# Patient Record
Sex: FEMALE | Race: BLACK OR AFRICAN AMERICAN | NOT HISPANIC OR LATINO | URBAN - METROPOLITAN AREA
[De-identification: names, ages, dates, MRNs, and addresses within clinical notes are randomized per-mention and may not be internally consistent; named-entity substitution may affect disease eponyms.]

---

## 2020-11-10 ENCOUNTER — HOSPITAL ENCOUNTER (EMERGENCY)
Facility: HOSPITAL | Age: 25
Discharge: HOME | End: 2020-11-10
Attending: EMERGENCY MEDICINE
Payer: MEDICAID

## 2020-11-10 VITALS
WEIGHT: 129 LBS | HEART RATE: 72 BPM | HEIGHT: 65 IN | BODY MASS INDEX: 21.49 KG/M2 | SYSTOLIC BLOOD PRESSURE: 109 MMHG | OXYGEN SATURATION: 100 % | RESPIRATION RATE: 16 BRPM | DIASTOLIC BLOOD PRESSURE: 55 MMHG | TEMPERATURE: 98 F

## 2020-11-10 DIAGNOSIS — B96.89 BACTERIAL VAGINOSIS: ICD-10-CM

## 2020-11-10 DIAGNOSIS — R10.9 ABDOMINAL PAIN, UNSPECIFIED ABDOMINAL LOCATION: ICD-10-CM

## 2020-11-10 DIAGNOSIS — N12 PYELONEPHRITIS: ICD-10-CM

## 2020-11-10 DIAGNOSIS — N76.0 BACTERIAL VAGINOSIS: ICD-10-CM

## 2020-11-10 DIAGNOSIS — R30.0 DYSURIA: Primary | ICD-10-CM

## 2020-11-10 LAB
ALBUMIN SERPL BCP-MCNC: 4 G/DL (ref 3.5–5.2)
ALP SERPL-CCNC: 44 U/L (ref 55–135)
ALT SERPL W/O P-5'-P-CCNC: 20 U/L (ref 10–44)
ANION GAP SERPL CALC-SCNC: 10 MMOL/L (ref 8–16)
AST SERPL-CCNC: 19 U/L (ref 10–40)
B-HCG UR QL: NEGATIVE
BACTERIA #/AREA URNS AUTO: ABNORMAL /HPF
BACTERIA GENITAL QL WET PREP: ABNORMAL
BASOPHILS # BLD AUTO: 0.03 K/UL (ref 0–0.2)
BASOPHILS NFR BLD: 0.3 % (ref 0–1.9)
BILIRUB SERPL-MCNC: 0.3 MG/DL (ref 0.1–1)
BILIRUB UR QL STRIP: NEGATIVE
BUN SERPL-MCNC: 14 MG/DL (ref 6–20)
CALCIUM SERPL-MCNC: 9.6 MG/DL (ref 8.7–10.5)
CHLORIDE SERPL-SCNC: 105 MMOL/L (ref 95–110)
CLARITY UR REFRACT.AUTO: ABNORMAL
CLUE CELLS VAG QL WET PREP: ABNORMAL
CO2 SERPL-SCNC: 21 MMOL/L (ref 23–29)
COLOR UR AUTO: YELLOW
CREAT SERPL-MCNC: 0.7 MG/DL (ref 0.5–1.4)
CTP QC/QA: YES
DIFFERENTIAL METHOD: ABNORMAL
EOSINOPHIL # BLD AUTO: 0.1 K/UL (ref 0–0.5)
EOSINOPHIL NFR BLD: 1.2 % (ref 0–8)
ERYTHROCYTE [DISTWIDTH] IN BLOOD BY AUTOMATED COUNT: 16.5 % (ref 11.5–14.5)
EST. GFR  (AFRICAN AMERICAN): >60 ML/MIN/1.73 M^2
EST. GFR  (NON AFRICAN AMERICAN): >60 ML/MIN/1.73 M^2
FILAMENT FUNGI VAG WET PREP-#/AREA: ABNORMAL
GLUCOSE SERPL-MCNC: 107 MG/DL (ref 70–110)
GLUCOSE UR QL STRIP: NEGATIVE
HCT VFR BLD AUTO: 38 % (ref 37–48.5)
HGB BLD-MCNC: 11.6 G/DL (ref 12–16)
HGB UR QL STRIP: ABNORMAL
HYALINE CASTS UR QL AUTO: 0 /LPF
IMM GRANULOCYTES # BLD AUTO: 0.02 K/UL (ref 0–0.04)
IMM GRANULOCYTES NFR BLD AUTO: 0.2 % (ref 0–0.5)
KETONES UR QL STRIP: NEGATIVE
LEUKOCYTE ESTERASE UR QL STRIP: ABNORMAL
LIPASE SERPL-CCNC: 22 U/L (ref 4–60)
LYMPHOCYTES # BLD AUTO: 1.9 K/UL (ref 1–4.8)
LYMPHOCYTES NFR BLD: 20.2 % (ref 18–48)
MCH RBC QN AUTO: 24.3 PG (ref 27–31)
MCHC RBC AUTO-ENTMCNC: 30.5 G/DL (ref 32–36)
MCV RBC AUTO: 80 FL (ref 82–98)
MICROSCOPIC COMMENT: ABNORMAL
MONOCYTES # BLD AUTO: 1.1 K/UL (ref 0.3–1)
MONOCYTES NFR BLD: 11 % (ref 4–15)
NEUTROPHILS # BLD AUTO: 6.4 K/UL (ref 1.8–7.7)
NEUTROPHILS NFR BLD: 67.1 % (ref 38–73)
NITRITE UR QL STRIP: NEGATIVE
NRBC BLD-RTO: 0 /100 WBC
PH UR STRIP: 6 [PH] (ref 5–8)
PLATELET # BLD AUTO: 344 K/UL (ref 150–350)
PMV BLD AUTO: 12.2 FL (ref 9.2–12.9)
POTASSIUM SERPL-SCNC: 4.4 MMOL/L (ref 3.5–5.1)
PROT SERPL-MCNC: 7.9 G/DL (ref 6–8.4)
PROT UR QL STRIP: ABNORMAL
RBC # BLD AUTO: 4.78 M/UL (ref 4–5.4)
RBC #/AREA URNS AUTO: 7 /HPF (ref 0–4)
SODIUM SERPL-SCNC: 136 MMOL/L (ref 136–145)
SP GR UR STRIP: 1.01 (ref 1–1.03)
SPECIMEN SOURCE: ABNORMAL
SQUAMOUS #/AREA URNS AUTO: 1 /HPF
T VAGINALIS GENITAL QL WET PREP: ABNORMAL
URN SPEC COLLECT METH UR: ABNORMAL
WBC # BLD AUTO: 9.51 K/UL (ref 3.9–12.7)
WBC #/AREA URNS AUTO: >100 /HPF (ref 0–5)
WBC #/AREA VAG WET PREP: ABNORMAL
WBC CLUMPS UR QL AUTO: ABNORMAL
YEAST GENITAL QL WET PREP: ABNORMAL

## 2020-11-10 PROCEDURE — 99284 EMERGENCY DEPT VISIT MOD MDM: CPT | Mod: 25

## 2020-11-10 PROCEDURE — 87210 SMEAR WET MOUNT SALINE/INK: CPT

## 2020-11-10 PROCEDURE — 87088 URINE BACTERIA CULTURE: CPT

## 2020-11-10 PROCEDURE — 99284 PR EMERGENCY DEPT VISIT,LEVEL IV: ICD-10-PCS | Mod: ,,, | Performed by: EMERGENCY MEDICINE

## 2020-11-10 PROCEDURE — 87186 SC STD MICRODIL/AGAR DIL: CPT

## 2020-11-10 PROCEDURE — 81001 URINALYSIS AUTO W/SCOPE: CPT

## 2020-11-10 PROCEDURE — 99284 EMERGENCY DEPT VISIT MOD MDM: CPT | Mod: ,,, | Performed by: EMERGENCY MEDICINE

## 2020-11-10 PROCEDURE — 25000003 PHARM REV CODE 250: Performed by: EMERGENCY MEDICINE

## 2020-11-10 PROCEDURE — 87077 CULTURE AEROBIC IDENTIFY: CPT

## 2020-11-10 PROCEDURE — 83690 ASSAY OF LIPASE: CPT

## 2020-11-10 PROCEDURE — 87086 URINE CULTURE/COLONY COUNT: CPT

## 2020-11-10 PROCEDURE — 81003 URINALYSIS AUTO W/O SCOPE: CPT

## 2020-11-10 PROCEDURE — 85025 COMPLETE CBC W/AUTO DIFF WBC: CPT

## 2020-11-10 PROCEDURE — 81025 URINE PREGNANCY TEST: CPT | Performed by: EMERGENCY MEDICINE

## 2020-11-10 PROCEDURE — 80053 COMPREHEN METABOLIC PANEL: CPT

## 2020-11-10 RX ORDER — IBUPROFEN 600 MG/1
600 TABLET ORAL
Status: COMPLETED | OUTPATIENT
Start: 2020-11-10 | End: 2020-11-10

## 2020-11-10 RX ORDER — CEPHALEXIN 250 MG/1
500 CAPSULE ORAL EVERY 6 HOURS
Qty: 80 CAPSULE | Refills: 0 | Status: SHIPPED | OUTPATIENT
Start: 2020-11-10 | End: 2020-11-20

## 2020-11-10 RX ORDER — CEPHALEXIN 500 MG/1
500 CAPSULE ORAL
Status: COMPLETED | OUTPATIENT
Start: 2020-11-10 | End: 2020-11-10

## 2020-11-10 RX ORDER — PHENAZOPYRIDINE HYDROCHLORIDE 200 MG/1
200 TABLET, FILM COATED ORAL
Status: COMPLETED | OUTPATIENT
Start: 2020-11-10 | End: 2020-11-10

## 2020-11-10 RX ORDER — METRONIDAZOLE 500 MG/1
500 TABLET ORAL EVERY 12 HOURS
Qty: 20 TABLET | Refills: 0 | Status: SHIPPED | OUTPATIENT
Start: 2020-11-10 | End: 2020-11-20

## 2020-11-10 RX ADMIN — IBUPROFEN 600 MG: 600 TABLET ORAL at 04:11

## 2020-11-10 RX ADMIN — CEPHALEXIN 500 MG: 500 CAPSULE ORAL at 05:11

## 2020-11-10 RX ADMIN — PHENAZOPYRIDINE HYDROCHLORIDE 200 MG: 200 TABLET ORAL at 04:11

## 2020-11-10 NOTE — ED PROVIDER NOTES
Source of History:  Patient    Chief complaint:  Abdominal Pain (pt reports lower abdominal and back pain with blood in her urine x 2 days. + nausea. denies vaginal discharge. unknown LMP due to birth control)      HPI:  Kylie Mcdonald is a 25 y.o. female with no significant past medical history presenting to emergency department with complaint of 2 days of lower abdominal pain and now some left flank pain.  She feels nauseous but is not vomiting.  No fevers or chills.  She has had dysuria, hematuria, increased urinary frequency.  No vaginal discharge.  She recently received the birth control shot, so her last menstrual period is unknown.    She is sexually active with 1 male partner.  No history of STDs.  Declines empiric treatment for exposure to gonorrhea and chlamydia but would like a wet prep performed.    ROS: As per HPI and below:  Review of Systems   Constitutional: Negative for fever.   HENT: Negative for sore throat.    Eyes: Negative for double vision.   Respiratory: Negative for cough and shortness of breath.    Cardiovascular: Negative for chest pain.   Gastrointestinal: Positive for abdominal pain and nausea. Negative for vomiting.   Genitourinary: Positive for dysuria, flank pain, frequency, hematuria and urgency.   Musculoskeletal: Negative for falls.   Skin: Negative for rash.   Neurological: Negative for headaches.     Review of patient's allergies indicates:  No Known Allergies    No current facility-administered medications on file prior to encounter.      No current outpatient medications on file prior to encounter.       PMH:  As per HPI and below:  History reviewed. No pertinent past medical history.  History reviewed. No pertinent surgical history.    Social History     Socioeconomic History    Marital status: Single     Spouse name: Not on file    Number of children: Not on file    Years of education: Not on file    Highest education level: Not on file   Occupational History    Not on  file   Social Needs    Financial resource strain: Not on file    Food insecurity     Worry: Not on file     Inability: Not on file    Transportation needs     Medical: Not on file     Non-medical: Not on file   Tobacco Use    Smoking status: Never Smoker   Substance and Sexual Activity    Alcohol use: Yes     Comment: socially    Drug use: Never    Sexual activity: Not on file   Lifestyle    Physical activity     Days per week: Not on file     Minutes per session: Not on file    Stress: Not on file   Relationships    Social connections     Talks on phone: Not on file     Gets together: Not on file     Attends [employer] service: Not on file     Active member of club or organization: Not on file     Attends meetings of clubs or organizations: Not on file     Relationship status: Not on file   Other Topics Concern    Not on file   Social History Narrative    Not on file       History reviewed. No pertinent family history.    Physical Exam:      Vitals:    11/10/20 0320   BP: 121/75   Pulse: 95   Resp: 16   Temp: 98.8 °F (37.1 °C)     Gen: No acute distress.  Uncomfortable  Mental Status:  Alert and oriented x 3.  Appropriate, conversant.  Skin: Warm, dry. No rashes seen.  Eyes: No conjunctival injection.  Pulm: No increased work of breathing.  No significant tachypnea.  No audible stridor or wheezing.  No conversational dyspnea.  Auscultation limited secondary to PPE.  CV: Regular rate. Regular rhythm. Normal peripheral perfusion. No lower extremity edema.  Abd: Soft.  Not distended.  Mild diffuse tenderness to palpation, most prominent in the bilateral lower quadrants, without rebound tenderness or guarding  MSK: Good range of motion all joints.  No deformities.    Left CVA tenderness.  :  Normal external genitalia.  No lesions.  Thin blood-tinged discharge present in the vagina  Bilateral adnexal tenderness without masses or cervical motion tenderness.  Neuro: Awake. Speech normal. No focal neuro  deficit observed.    Laboratory Studies:  Labs Reviewed   CBC W/ AUTO DIFFERENTIAL - Abnormal; Notable for the following components:       Result Value    Hemoglobin 11.6 (*)     MCV 80 (*)     MCH 24.3 (*)     MCHC 30.5 (*)     RDW 16.5 (*)     Mono # 1.1 (*)     All other components within normal limits   COMPREHENSIVE METABOLIC PANEL - Abnormal; Notable for the following components:    CO2 21 (*)     Alkaline Phosphatase 44 (*)     All other components within normal limits   URINALYSIS, REFLEX TO URINE CULTURE - Abnormal; Notable for the following components:    Appearance, UA Cloudy (*)     Protein, UA 2+ (*)     Occult Blood UA 2+ (*)     Leukocytes, UA 3+ (*)     All other components within normal limits    Narrative:     Specimen Source->Urine   VAGINAL SCREEN - Abnormal; Notable for the following components:    Clue Cells Rare (*)     WBC - Vaginal Screen Occasional (*)     Bacteria - Vaginal Screen Few (*)     All other components within normal limits   URINALYSIS MICROSCOPIC - Abnormal; Notable for the following components:    RBC, UA 7 (*)     WBC, UA >100 (*)     WBC Clumps, UA Occasional (*)     Bacteria Many (*)     All other components within normal limits    Narrative:     Specimen Source->Urine   CULTURE, URINE   LIPASE   POCT URINE PREGNANCY     Chart reviewed.     Imaging Results    None         Medications Given:  Medications   phenazopyridine tablet 200 mg (200 mg Oral Given 11/10/20 0447)   ibuprofen tablet 600 mg (600 mg Oral Given 11/10/20 0446)   cephALEXin capsule 500 mg (500 mg Oral Given 11/10/20 0555)     MDM:    25 y.o. female with complaint of nausea, abdominal pain, flank pain, and urinary discomfort.  Here she is afebrile, stable, nontoxic.    Differential diagnosis including but not limited to:  Pyelonephritis versus urinary tract infection, intra-abdominal infection, pregnancy, ovarian cyst.  Less likely torsion.    Labs reviewed.  No leukocytosis.  Hemoglobin 11.6.  No acute  kidney injury.  CMP relatively unremarkable.    Urine pregnancy test negative.    Vaginal wet prep with rare clue cells.    Urinalysis with 2+ protein and occult blood, greater than 100 wbc's and many bacteria, consistent with urinary tract infection    Patient received Pyridium and ibuprofen for her pain with significant improvement.  Will treat for pyelonephritis with Keflex.    At time of sign-out, ultrasound of the pelvis is pending for final disposition, anticipate discharge with Keflex prescription if no acute findings.    Diagnostic Impression:    1. Dysuria    2. Abdominal pain, unspecified abdominal location    3. Pyelonephritis    4. Bacterial vaginosis               Patient and/or family understands the plan and is in agreement, verbalized understanding, questions answered    Bridgette Stock MD  Emergency Medicine       Bridgette Stock MD  11/10/20 7397

## 2020-11-10 NOTE — DISCHARGE INSTRUCTIONS
Diagnosis: 1. Urinary tract infection, kidney infection  2.  Bacterial vaginosis.  This is an overgrowth of the wrong type of bacteria in the vagina, not a sexually transmitted disease    Tests today showed:   Labs Reviewed   CBC W/ AUTO DIFFERENTIAL - Abnormal; Notable for the following components:       Result Value    Hemoglobin 11.6 (*)     MCV 80 (*)     MCH 24.3 (*)     MCHC 30.5 (*)     RDW 16.5 (*)     Mono # 1.1 (*)     All other components within normal limits   COMPREHENSIVE METABOLIC PANEL - Abnormal; Notable for the following components:    CO2 21 (*)     Alkaline Phosphatase 44 (*)     All other components within normal limits   URINALYSIS, REFLEX TO URINE CULTURE - Abnormal; Notable for the following components:    Appearance, UA Cloudy (*)     Protein, UA 2+ (*)     Occult Blood UA 2+ (*)     Leukocytes, UA 3+ (*)     All other components within normal limits    Narrative:     Specimen Source->Urine   VAGINAL SCREEN - Abnormal; Notable for the following components:    Clue Cells Rare (*)     WBC - Vaginal Screen Occasional (*)     Bacteria - Vaginal Screen Few (*)     All other components within normal limits   URINALYSIS MICROSCOPIC - Abnormal; Notable for the following components:    RBC, UA 7 (*)     WBC, UA >100 (*)     WBC Clumps, UA Occasional (*)     Bacteria Many (*)     All other components within normal limits    Narrative:     Specimen Source->Urine   CULTURE, URINE   LIPASE   POCT URINE PREGNANCY     Imaging Results    None         Treatments you had today:   Medications   cephALEXin capsule 500 mg (has no administration in time range)   phenazopyridine tablet 200 mg (200 mg Oral Given 11/10/20 0447)   ibuprofen tablet 600 mg (600 mg Oral Given 11/10/20 0446)       Home Care Instructions:  - Take the prescribed antibiotics as directed - do not drink alcohol while taking Flagyl, as it can make you very sick  - Stay well hydrated  - For relief of burning and the feeling of urgency, take Azo  over-the-counter according to packaging directions for up to two days. This medication may turn your urine yellow-orange, and may stain clothing.  - Continue taking your home medications as prescribed    Take ibuprofen (also called Advil, Motrin) for your pain. This medicine is available over-the-counter in 200 mg tablets.  - You may take 600 mg every 6 hours, or 800 mg every 8 hours as needed   - Do not take more than this amount, as it can cause kidney problems, bleeding in your stomach, and other serious problems.   - Do not also take naproxen (Aleve) at the same time or on the same day  - If you have heart problems or uncontrolled high blood pressure, you should not take ibuprofen for more than 3 days without discussing with your doctor    Follow-up plan:  - Follow-up with: Primary care doctor within 3 - 5 days  - Additional testing and/or evaluation as directed by your primary doctor    Return to the Emergency Department for symptoms including but not limited to: worsening symptoms, severe abdominal or back pain, shortness of breath or chest pain, vomiting with inability to hold down fluids, fevers greater than 100.4°F, dizziness, passing out/fainting/unconsciousness, or other concerning symptoms.

## 2020-11-10 NOTE — ED TRIAGE NOTES
Pt presents with abdominal pain and nausea x2 days. Pt states pain is in lower abdomen and back. Does not know last menstrual period. Reports blood in urine.

## 2020-11-10 NOTE — ED NOTES
Adult Physical Assessment  LOC: Kylie Mcodnald, 25 y.o. female verified via two identifiers.  The patient is awake, alert, oriented and speaking appropriately at this time.  APPEARANCE: Patient resting comfortably and appears to be in no acute distress at this time. Patient is clean and well groomed, patient's clothing is properly fastened.  SKIN:The skin is warm and dry, color consistent with ethnicity, patient has normal skin turgor and moist mucus membranes, skin intact, no breakdown or brusing noted.  MUSCULOSKELETAL: Patient moving all extremities well, no obvious swelling or deformities noted.  RESPIRATORY: Airway is open and patent, respirations are spontaneous, patient has a normal effort and rate, no accessory muscle use noted.  CARDIAC: Patient has a normal rate and rhythm, no periphreal edema noted in any extremity, capillary refill < 3 seconds in all extremities  ABDOMEN: Soft and non tender to palpation, no abdominal distention noted. Bowel sounds present in all four quadrants.  NEUROLOGIC: Eyes open spontaneously, behavior appropriate to situation, follows commands, facial expression symmetrical, bilateral hand grasp equal and even, purposeful motor response noted, normal sensation in all extremities when touched with a finger.

## 2020-11-12 LAB — BACTERIA UR CULT: ABNORMAL

## 2021-04-27 ENCOUNTER — TELEPHONE (OUTPATIENT)
Dept: OBSTETRICS AND GYNECOLOGY | Facility: CLINIC | Age: 26
End: 2021-04-27

## 2021-07-09 ENCOUNTER — HOSPITAL ENCOUNTER (EMERGENCY)
Facility: HOSPITAL | Age: 26
Discharge: HOME | End: 2021-07-09
Attending: EMERGENCY MEDICINE
Payer: MEDICAID

## 2021-07-09 VITALS
DIASTOLIC BLOOD PRESSURE: 66 MMHG | RESPIRATION RATE: 15 BRPM | WEIGHT: 141 LBS | HEIGHT: 65 IN | TEMPERATURE: 98 F | SYSTOLIC BLOOD PRESSURE: 125 MMHG | BODY MASS INDEX: 23.49 KG/M2 | HEART RATE: 90 BPM | OXYGEN SATURATION: 98 %

## 2021-07-09 DIAGNOSIS — R55 SYNCOPE, UNSPECIFIED SYNCOPE TYPE: Primary | ICD-10-CM

## 2021-07-09 DIAGNOSIS — R07.9 CHEST PAIN: ICD-10-CM

## 2021-07-09 LAB
BILIRUB UR QL STRIP: NEGATIVE
BUN SERPL-MCNC: 7 MG/DL (ref 6–30)
CHLORIDE SERPL-SCNC: 104 MMOL/L (ref 95–110)
CLARITY UR REFRACT.AUTO: ABNORMAL
COLOR UR AUTO: YELLOW
CREAT SERPL-MCNC: 0.4 MG/DL (ref 0.5–1.4)
CTP QC/QA: YES
CTP QC/QA: YES
GLUCOSE SERPL-MCNC: 81 MG/DL (ref 70–110)
GLUCOSE UR QL STRIP: ABNORMAL
HCT VFR BLD CALC: 32 %PCV (ref 36–54)
HGB UR QL STRIP: NEGATIVE
KETONES UR QL STRIP: NEGATIVE
LEUKOCYTE ESTERASE UR QL STRIP: NEGATIVE
NITRITE UR QL STRIP: NEGATIVE
PH UR STRIP: 5 [PH] (ref 5–8)
POC IONIZED CALCIUM: 1.06 MMOL/L (ref 1.06–1.42)
POC MOLECULAR INFLUENZA A AGN: NEGATIVE
POC MOLECULAR INFLUENZA B AGN: NEGATIVE
POC TCO2 (MEASURED): 23 MMOL/L (ref 23–29)
POTASSIUM BLD-SCNC: 4.5 MMOL/L (ref 3.5–5.1)
PROT UR QL STRIP: NEGATIVE
SAMPLE: ABNORMAL
SARS-COV-2 RDRP RESP QL NAA+PROBE: NEGATIVE
SODIUM BLD-SCNC: 136 MMOL/L (ref 136–145)
SP GR UR STRIP: 1.03 (ref 1–1.03)
URN SPEC COLLECT METH UR: ABNORMAL

## 2021-07-09 PROCEDURE — 93005 ELECTROCARDIOGRAM TRACING: CPT

## 2021-07-09 PROCEDURE — 93010 EKG 12-LEAD: ICD-10-PCS | Mod: ,,, | Performed by: INTERNAL MEDICINE

## 2021-07-09 PROCEDURE — 81003 URINALYSIS AUTO W/O SCOPE: CPT | Performed by: EMERGENCY MEDICINE

## 2021-07-09 PROCEDURE — 99285 EMERGENCY DEPT VISIT HI MDM: CPT | Mod: CS,,, | Performed by: EMERGENCY MEDICINE

## 2021-07-09 PROCEDURE — U0002 COVID-19 LAB TEST NON-CDC: HCPCS | Performed by: EMERGENCY MEDICINE

## 2021-07-09 PROCEDURE — 99285 EMERGENCY DEPT VISIT HI MDM: CPT | Mod: 25

## 2021-07-09 PROCEDURE — 87502 INFLUENZA DNA AMP PROBE: CPT

## 2021-07-09 PROCEDURE — 96360 HYDRATION IV INFUSION INIT: CPT

## 2021-07-09 PROCEDURE — 25000003 PHARM REV CODE 250: Performed by: EMERGENCY MEDICINE

## 2021-07-09 PROCEDURE — 99285 PR EMERGENCY DEPT VISIT,LEVEL V: ICD-10-PCS | Mod: CS,,, | Performed by: EMERGENCY MEDICINE

## 2021-07-09 PROCEDURE — 93010 ELECTROCARDIOGRAM REPORT: CPT | Mod: ,,, | Performed by: INTERNAL MEDICINE

## 2021-07-09 RX ORDER — ACETAMINOPHEN 500 MG
500 TABLET ORAL EVERY 6 HOURS PRN
Qty: 20 TABLET | Refills: 0 | Status: SHIPPED | OUTPATIENT
Start: 2021-07-09

## 2021-07-09 RX ORDER — ACETAMINOPHEN 325 MG/1
650 TABLET ORAL
Status: COMPLETED | OUTPATIENT
Start: 2021-07-09 | End: 2021-07-09

## 2021-07-09 RX ORDER — LIDOCAINE 50 MG/G
1 PATCH TOPICAL
Status: DISCONTINUED | OUTPATIENT
Start: 2021-07-09 | End: 2021-07-10 | Stop reason: HOSPADM

## 2021-07-09 RX ADMIN — LIDOCAINE 1 PATCH: 50 PATCH TOPICAL at 07:07

## 2021-07-09 RX ADMIN — ACETAMINOPHEN 650 MG: 325 TABLET ORAL at 07:07

## 2022-01-14 ENCOUNTER — HOSPITAL ENCOUNTER (EMERGENCY)
Facility: HOSPITAL | Age: 27
Discharge: HOME | End: 2022-01-14
Attending: EMERGENCY MEDICINE
Payer: MEDICAID

## 2022-01-14 VITALS
SYSTOLIC BLOOD PRESSURE: 130 MMHG | DIASTOLIC BLOOD PRESSURE: 79 MMHG | OXYGEN SATURATION: 99 % | RESPIRATION RATE: 16 BRPM | TEMPERATURE: 100 F | HEART RATE: 85 BPM

## 2022-01-14 DIAGNOSIS — U07.1 COVID-19: Primary | ICD-10-CM

## 2022-01-14 LAB
CTP QC/QA: YES
SARS-COV-2 RDRP RESP QL NAA+PROBE: POSITIVE

## 2022-01-14 PROCEDURE — 99284 EMERGENCY DEPT VISIT MOD MDM: CPT | Mod: CR,CS,, | Performed by: PHYSICIAN ASSISTANT

## 2022-01-14 PROCEDURE — U0002 COVID-19 LAB TEST NON-CDC: HCPCS | Performed by: EMERGENCY MEDICINE

## 2022-01-14 PROCEDURE — 99284 PR EMERGENCY DEPT VISIT,LEVEL IV: ICD-10-PCS | Mod: CR,CS,, | Performed by: PHYSICIAN ASSISTANT

## 2022-01-14 PROCEDURE — 99282 EMERGENCY DEPT VISIT SF MDM: CPT | Mod: 25

## 2022-01-14 NOTE — Clinical Note
"Kylie "Cary Mcdonald was seen and treated in our emergency department on 1/14/2022.     COVID-19 is present in our communities across the state. There is limited testing for COVID at this time, so not all patients can be tested. In this situation, your employee meets the following criteria:    Kylie Mcdonald has met the criteria for COVID-19 testing and has a POSITIVE result. She can return to work once they are asymptomatic for 24 hours without the use of fever reducing medications AND at least five days from the first positive result. A mask is recommended for 5 days post quarantine.     If you have any questions or concerns, or if I can be of further assistance, please do not hesitate to contact me.    Sincerely,             Elva Huerta PA-C"

## 2022-01-14 NOTE — DISCHARGE INSTRUCTIONS
If you have a COVID Test PENDING:  Please access [facility] to review the results of your test. Until the results of your COVID test return, you should isolate yourself so as not to potentially spread illness to others.   If your COVID test returns positive, you should isolate yourself so as not to spread illness to others. After five full days, if you are feeling better and you have not had fever for 24 hours, you can return to your typical daily activities, but you must wear a mask around others for an additional 5 days.   If your COVID test returns negative and you are either unvaccinated or more than six months out from your two-dose vaccine and are not yet boosted, you should still quarantine for 5 full days followed by strict mask use for an additional 5 full days.   If your COVID test returns negative and you have received your 2-dose initial vaccine as well as a booster, you should continue strict mask use for 10 full days after the exposure.  For all those exposed, best practice includes a test at day 5 after the exposure. This can be a home test or a test through one of the many testing centers throughout our community.   Masking is always advised to limit the spread of COVID. Cdc.gov is an excellent site to obtain the latest up to date recommendations regarding COVID and COVID testing.     After your evaluation today, we ruled out any emergent condition. However, if you develop shortness of breath, chest pain, or ANY OTHER CONCERNS please return to the emergency department for further care.      CDC Testing and Quarantine Guidelines for patients with exposure to a known-positive COVID-19 person:  A close exposure is defined as anyone who has had an exposure (masked or unmasked) to a known COVID -19 positive person within 6 feet of someone for a cumulative total of 15 minutes or more over a 24-hour period.   Vaccinated and/or if you recently had a positive covid test within 90 days do NOT need to  quarantine after contact with someone who had COVID-19 unless you develop symptoms.   Fully vaccinated people who have not had a positive test within 90 days, should get tested 3-5 days after their exposure, even if they don't have symptoms and wear a mask indoors in public for 14 days following exposure or until their test result is negative.      Unvaccinated and/or NOT had a positive test within 90 days and meet close exposure  You are required by CDC guidelines to quarantine for at least 5 days from time of exposure followed by 5 days of strict masking. It is recommended, but not required to test after 5 days, unless you develop symptoms, in which case you should test at that time.  If you get tested after 5 days and your test is positive, your 5 day period of isolation starts the day of the positive test.    If your exposure does not meet the above definition, you can return to your normal daily activities to include social distancing, wearing a mask and frequent handwashing.      Here is a link to guidance from the CDC:  https://www.cdc.gov/media/releases/2021/s1227-isolation-quarantine-guidance.html      [facility] Testing Sites:  https://ldh.la.gov/page/3934      [facility] website with testing locations and guidance:  [email or web address]

## 2022-01-14 NOTE — ED TRIAGE NOTES
Kylie Mcdonald, a 26 y.o. female presents to the ED w/ complaint of all covid symptoms.    Triage note:  Chief Complaint   Patient presents with    COVID-19 Concerns     Body aches, headache, diarrhea, cough and congestion for about week      Review of patient's allergies indicates:  No Known Allergies  No past medical history on file.      Two patient identifiers have been checked and are correct.      Appearance: Pt awake, alert & oriented to person, place & time. Pt in no acute distress at present time. Pt is clean and well groomed with clothes appropriately fastened.   Skin: Skin warm, dry & intact. Color consistent with ethnicity. Mucous membranes moist. No breakdown or brusing noted.   Musculoskeletal: Patient moving all extremities well, reports body aches and weakness.   Respiratory: Respirations spontaneous, even, and non-labored. Visible chest rise noted. Airway is open and patent. Pt reports frequent cough and congestion.  Neurologic: Speech is clear and appropriate. Eyes open spontaneously, behavior appropriate to situation. +Headache   Cardiac: All peripheral pulses present. No Bilateral lower extremity edema.   Abdomen: Abdomen soft, non-tender to palpation. +diarrhea

## 2022-11-16 PROBLEM — O47.00 THREATENED PRETERM LABOR: Status: ACTIVE | Noted: 2022-11-16

## 2022-11-18 PROBLEM — I49.9 IRREGULAR HEART RHYTHM: Status: ACTIVE | Noted: 2022-11-18

## 2022-11-18 PROBLEM — O60.00 PREMATURE LABOR: Status: ACTIVE | Noted: 2022-11-18

## 2022-11-18 PROBLEM — O28.8 EQUIVOCAL NON-STRESS TEST: Status: ACTIVE | Noted: 2022-11-18
